# Patient Record
(demographics unavailable — no encounter records)

---

## 2024-10-08 NOTE — PHYSICAL EXAM
[Alert] : alert [Acute Distress] : no acute distress [Normocephalic] : normocephalic [Flat Open Anterior [place]] : flat open anterior fontanelle [PERRL] : PERRL [Red Reflex Bilateral] : red reflex bilateral [Normally Placed Ears] : normally placed ears [Auricles Well Formed] : auricles well formed [Clear Tympanic membranes] : clear tympanic membranes [Light reflex present] : light reflex present [Bony landmarks visible] : bony landmarks visible [Discharge] : no discharge [Nares Patent] : nares patent [Palate Intact] : palate intact [Uvula Midline] : uvula midline [Supple, full passive range of motion] : supple, full passive range of motion [Palpable Masses] : no palpable masses [Symmetric Chest Rise] : symmetric chest rise [Clear to Auscultation Bilaterally] : clear to auscultation bilaterally [Regular Rate and Rhythm] : regular rate and rhythm [S1, S2 present] : S1, S2 present [Murmurs] : no murmurs [+2 Femoral Pulses] : +2 femoral pulses [Soft] : soft [Tender] : nontender [Distended] : not distended [Bowel Sounds] : bowel sounds present [Hepatomegaly] : no hepatomegaly [Splenomegaly] : no splenomegaly [Normal external genitailia] : normal external genitalia [Clitoromegaly] : no clitoromegaly [Patent Vagina] : vagina patent [Normally Placed] : normally placed [No Abnormal Lymph Nodes Palpated] : no abnormal lymph nodes palpated [Pineda-Ortolani] : negative Pineda-Ortolani [Symmetric Flexed Extremities] : symmetric flexed extremities [Spinal Dimple] : no spinal dimple [Tuft of Hair] : no tuft of hair [Startle Reflex] : startle reflex present [Suck Reflex] : suck reflex present [Rooting] : rooting reflex present [Palmar Grasp] : palmar grasp reflex present [Plantar Grasp] : plantar grasp reflex present [Symmetric Ben] : symmetric [place] [Jaundice] : no jaundice [Rash and/or lesion present] : no rash/lesion [de-identified] : nevus flammeus of eyelid

## 2024-10-08 NOTE — DEVELOPMENTAL MILESTONES
[Passed] : passed [FreeTextEntry2] : 3 [Normal Development] : Normal Development [Calms when picked up or spoken to] : calms when picked up or spoken to [Looks briefly at objects] : looks briefly at objects [Alerts to unexpected sound] : alerts to unexpected sound [Makes brief short vowel sounds] : makes brief short vowel sounds [Holds chin up in prone] : holds chin up in prone [Holds fingers more open at rest] : holds fingers more open at rest

## 2024-10-08 NOTE — DISCUSSION/SUMMARY
[Normal Growth] : growth [Normal Development] : development  [Continue Regimen] : feeding [No Skin Concerns] : skin [Normal Sleep Pattern] : sleep [Term Infant] : term infant [Anticipatory Guidance Given] : Anticipatory guidance addressed as per the history of present illness section [Parental Well-Being] : parental well-being [Family Adjustment] : family adjustment [Feeding Routines] : feeding routines [Infant Adjustment] : infant adjustment [Safety] : safety [Hepatitis B] : hepatitis B [Parental Concerns Addressed] : Parental concerns addressed [de-identified] : Weight gain steady at 0.5  [] : The components of the vaccine(s) to be administered today are listed in the plan of care. The disease(s) for which the vaccine(s) are intended to prevent and the risks have been discussed with the caretaker.  The risks are also included in the appropriate vaccination information statements which have been provided to the patient's caregiver.  The caregiver has given consent to vaccinate. [FreeTextEntry1] : Risks and benefits discussed regarding the vaccines needed and the protection provided. 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 May offer pacifier if needed Start tummy time when awake Limit baby's exposure to others, especially those with fever or unknown vaccine status Parents counseled to call if rectal temperature >100.4 degrees F Return in one month for 2M visit; sooner PRN

## 2024-10-08 NOTE — HISTORY OF PRESENT ILLNESS
[Parents] : parents [Formula ___ oz/feed] : [unfilled] oz of formula per feed [Hours between feeds ___] : Child is fed every [unfilled] hours [Normal] : Normal [Frequency of stools: ___] : Frequency of stools: [unfilled]  stools [per day] : per day. [In Bassinet/Crib] : sleeps in bassinet/crib [On back] : sleeps on back [Pacifier use] : Pacifier use [No] : No cigarette smoke exposure [Water heater temperature set at <120 degrees F] : Water heater temperature set at <120 degrees F [Rear facing car seat in back seat] : Rear facing car seat in back seat [Carbon Monoxide Detectors] : Carbon monoxide detectors at home [Smoke Detectors] : Smoke detectors at home. [Loose bedding, pillow, toys, and/or bumpers in crib] : no loose bedding, pillow, toys, and/or bumpers in crib [FreeTextEntry1] :  35 day old here for routine visit  No concerns pt doing well with formula feeds, limited spitting up. Feeds increasing in volume as tolerated More awake, completing tummy time  Concern for possible aortic arch anomaly with most recent ECHO 9/13 being reassuring Denies cyanosis, diaphoresis, or difficulty with feeds

## 2024-11-22 NOTE — PHYSICAL EXAM
[Alert] : alert [Normocephalic] : normocephalic [Flat Open Anterior [place]] : flat open anterior fontanelle [PERRL] : PERRL [Red Reflex Bilateral] : red reflex bilateral [Normally Placed Ears] : normally placed ears [Auricles Well Formed] : auricles well formed [Clear Tympanic membranes] : clear tympanic membranes [Light reflex present] : light reflex present [Bony landmarks visible] : bony landmarks visible [Nares Patent] : nares patent [Palate Intact] : palate intact [Uvula Midline] : uvula midline [Supple, full passive range of motion] : supple, full passive range of motion [Symmetric Chest Rise] : symmetric chest rise [Clear to Auscultation Bilaterally] : clear to auscultation bilaterally [Regular Rate and Rhythm] : regular rate and rhythm [S1, S2 present] : S1, S2 present [+2 Femoral Pulses] : +2 femoral pulses [Soft] : soft [Bowel Sounds] : bowel sounds present [Normal external genitailia] : normal external genitalia [Patent Vagina] : vagina patent [Normally Placed] : normally placed [No Abnormal Lymph Nodes Palpated] : no abnormal lymph nodes palpated [Symmetric Flexed Extremities] : symmetric flexed extremities [Startle Reflex] : startle reflex present [Suck Reflex] : suck reflex present [Rooting] : rooting reflex present [Palmar Grasp] : palmar grasp reflex present [Plantar Grasp] : plantar grasp reflex present [Symmetric Ben] : symmetric [place] [Acute Distress] : no acute distress [Discharge] : no discharge [Palpable Masses] : no palpable masses [Murmurs] : no murmurs [Tender] : nontender [Distended] : not distended [Hepatomegaly] : no hepatomegaly [Splenomegaly] : no splenomegaly [Clitoromegaly] : no clitoromegaly [Pineda-Ortolani] : negative Pineda-Ortolani [Spinal Dimple] : no spinal dimple [Tuft of Hair] : no tuft of hair [Rash and/or lesion present] : no rash/lesion [FreeTextEntry7] : mild pectus excavatum on exam  [de-identified] : dry skin noted on abdomen

## 2024-11-22 NOTE — DISCUSSION/SUMMARY
Diet:   Low fat diet.    Activities:  As tolerated.    Discharge Medications:  None.      Other Instructions:  Call [facility] at [phone] or come to the Emergency Department if you experience the following: Chest pain, abdominal pain, jaundice, lightning of stool color or document of urine color  bleeding (vomiting of blood or coffee colored material, black stools or pasha blood in stools), fever/chills, nausea and vomiting or dizziness.      Follow-up:  DR. ANDREW CASANOVA in 6-8 weeks.Office phone # [phone].  If you already have an appointment keep the appointment.   [Normal Growth] : growth [Normal Development] : development  [Continue Regimen] : feeding [Anticipatory Guidance Given] : Anticipatory guidance addressed as per the history of present illness section [Parental (Maternal) Well-Being] : parental (maternal) well-being [Infant-Family Synchrony] : infant-family synchrony [Nutritional Adequacy] : nutritional adequacy [Infant Behavior] : infant behavior [Safety] : safety [Age Approp Vaccines] : Age appropriate vaccines administered [DTaP] : diptheria, tetanus and pertussis [HiB] : haemophilus influenzae type B [IPV] : inactivated poliovirus [PCV] : pneumococcal conjugate vaccine [Rotavirus] : rotavirus [Parental Concerns Addressed] : Parental concerns addressed [] : The components of the vaccine(s) to be administered today are listed in the plan of care. The disease(s) for which the vaccine(s) are intended to prevent and the risks have been discussed with the caretaker.  The risks are also included in the appropriate vaccination information statements which have been provided to the patient's caregiver.  The caregiver has given consent to vaccinate. 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 Reviewed signs of distress or concerning symptoms to be aware of Pentacel, Prevnar, and Rotateq given by LPN w/out adverse event Age appropriate anticipatory guidance given as well as guidance regarding immunizations Infant acetaminophen dosing sheet provided Continue formula feeds, recommend iron-fortified formulations, 2-4 oz every 3-4 hrs.  When in car, patient should be in rear-facing car seat in back seat.  Put baby to sleep on back, in own crib with no loose or soft bedding.  Help baby to maintain sleep and feeding routines.  Continue tummy time when awake Return for 4M well visit, sooner PRN

## 2024-11-22 NOTE — HISTORY OF PRESENT ILLNESS
[Mother] : mother [Formula ___ oz/feed] : [unfilled] oz of formula per feed [Hours between feeds ___] : Child is fed every [unfilled] hours [Normal] : Normal [Frequency of stools: ___] : Frequency of stools: [unfilled]  stools [per day] : per day. [In Bassinet/Crib] : sleeps in bassinet/crib [On back] : sleeps on back [No] : No cigarette smoke exposure [Water heater temperature set at <120 degrees F] : Water heater temperature set at <120 degrees F [Rear facing car seat in back seat] : Rear facing car seat in back seat [Carbon Monoxide Detectors] : Carbon monoxide detectors at home [Co-sleeping] : no co-sleeping [FreeTextEntry1] : 2M here for  visit  Doing well with feeds, minimal spitting up, taking Alimentum formula. Sleeping through night, struggles with naps, contact napping during day Developmentally with social smile, trying to roll per parent when completing tummy time  Concerns for aortic arch anomaly with ECHO  being reassuring, concern for possible small PFO and very trivial PDA. Cardiology recommend f/u ECHO but unable to complete per mother due to insurance issue. No increased WOB, no cyanosis with feeding, no diaphoresis.

## 2024-12-09 NOTE — PHYSICAL EXAM
[[place]] : fontanelle flat [Clear Rhinorrhea] : clear rhinorrhea [Erythematous Oropharynx] : erythematous oropharynx [Wheezing] : no wheezing [Rales] : no rales [Tachypnea] : no tachypnea [Rhonchi] : no rhonchi [Subcostal Retractions] : no subcostal retractions [Suprasternal Retractions] : no suprasternal retractions [NL] : warm, clear

## 2024-12-09 NOTE — REVIEW OF SYSTEMS
[Nasal Discharge] : nasal discharge [Nasal Congestion] : nasal congestion [Tachypnea] : not tachypneic [Wheezing] : no wheezing [Cough] : no cough [Negative] : Genitourinary

## 2024-12-09 NOTE — HISTORY OF PRESENT ILLNESS
[FreeTextEntry6] : 3 M BIB father for concerns of cold symptoms. Parent reports congestion x 3 days. Pt with no fever. No significant cough. Some decreased appetite this morning due to congestion. No increased WOB, no cyanosis. Reports tolerating PO, no emesis, normal UOP, normal bowel movements. No close sick contacts.

## 2024-12-09 NOTE — DISCUSSION/SUMMARY
[FreeTextEntry1] : Acetaminophen as needed for fever Supportive care: cool mist humidifier, increase hydration, gentle nasal suction Appropriate anticipatory guidance and education given; seek care if symptoms persist or worsen, or if w/dec wet diapers, signs of distress (reviewed w/parent signs of resp distress), inability to tolerate PO

## 2025-01-17 NOTE — PHYSICAL EXAM
[Alert] : alert [Normocephalic] : normocephalic [Flat Open Anterior [place]] : flat open anterior fontanelle [Red Reflex] : red reflex bilateral [PERRL] : PERRL [Normally Placed Ears] : normally placed ears [Auricles Well Formed] : auricles well formed [Clear Tympanic membranes] : clear tympanic membranes [Light reflex present] : light reflex present [Bony landmarks visible] : bony landmarks visible [Nares Patent] : nares patent [Palate Intact] : palate intact [Uvula Midline] : uvula midline [Symmetric Chest Rise] : symmetric chest rise [Clear to Auscultation Bilaterally] : clear to auscultation bilaterally [Regular Rate and Rhythm] : regular rate and rhythm [S1, S2 present] : S1, S2 present [+2 Femoral Pulses] : (+) 2 femoral pulses [Soft] : soft [Bowel Sounds] : bowel sounds present [Normal External Genitalia] : normal external genitalia [Normal Vaginal Introitus] : normal vaginal introitus [Patent] : patent [Normally Placed] : normally placed [No Abnormal Lymph Nodes Palpated] : no abnormal lymph nodes palpated [Startle Reflex] : startle reflex present [Plantar Grasp] : plantar grasp reflex present [Symmetric Ben] : symmetric [place] [Acute Distress] : no acute distress [Discharge] : no discharge [Palpable Masses] : no palpable masses [Murmurs] : no murmurs [Tender] : nontender [Distended] : nondistended [Hepatomegaly] : no hepatomegaly [Splenomegaly] : no splenomegaly [Clitoromegaly] : no clitoromegaly [Pineda-Ortolani] : negative Pineda-Ortolani [Allis Sign] : negative Allis sign [Spinal Dimple] : no spinal dimple [Tuft of Hair] : no tuft of hair [Rash or Lesions] : no rash/lesions [FreeTextEntry8] : mild depression of the chest concerning for pectus excavatum

## 2025-01-17 NOTE — DISCUSSION/SUMMARY
[Normal Growth] : growth [Normal Development] : development  [No Elimination Concerns] : elimination [No Skin Concerns] : skin [Normal Sleep Pattern] : sleep [Anticipatory Guidance Given] : Anticipatory guidance addressed as per the history of present illness section [Family Functioning] : family functioning [Nutritional Adequacy and Growth] : nutritional adequacy and growth [Infant Development] : infant development [Oral Health] : oral health [Safety] : safety [DTaP] : diptheria, tetanus and pertussis [HiB] : haemophilus influenzae type B [IPV] : inactivated poliovirus [PCV] : pneumococcal conjugate vaccine [Rotavirus] : rotavirus [Parental Concerns Addressed] : Parental concerns addressed [] : The components of the vaccine(s) to be administered today are listed in the plan of care. The disease(s) for which the vaccine(s) are intended to prevent and the risks have been discussed with the caretaker.  The risks are also included in the appropriate vaccination information statements which have been provided to the patient's caregiver.  The caregiver has given consent to vaccinate. [FreeTextEntry1] :  Mild pectus excavatum, not affecting feeding, no increased WOB, will continue to monitor Recommend eval with cardio ASAP as pt overdue Feeding discussed including addition of solids if desired Cereal may be introduced using a spoon and bowl When in car, patient should be in rear-facing car seat in back seat Put baby to sleep on back, in own crib with no loose or soft bedding Lower crib mattress. Help baby to maintain sleep and feeding routines. May offer pacifier if needed. Continue tummy time when awake Pentacel, Prevnar, and Rotateq given w/out adverse event Age-appropriate anticipatory guidance given as well as guidance regarding immunizations Return for 6M well visit, sooner PRN

## 2025-01-17 NOTE — DEVELOPMENTAL MILESTONES
[Laughs aloud] : laughs aloud [Turns to voice] : turns to voice [Vocalizes with extending cooing] : vocalizes with extending cooing [Supports on elbows & wrists in prone] : supports on elbows and wrists in prone [Keeps hands unfisted] : keeps hands unfisted [Grasps objects] : grasps objects

## 2025-02-24 NOTE — REVIEW OF SYSTEMS
[___ Formula] : [unfilled] Formula  [___ ounces/feeding] : ~IRLANDA adam/feeding [___ Times/day] : [unfilled] times/day [Acting Fussy] : acting ~L fussy [Nl] : no feeding issues at this time. [Fever] : no fever [Wgt Loss (___ Lbs)] : no recent weight loss [Pallor] : not pale [Discharge] : no discharge [Redness] : no redness [Nasal Discharge] : no nasal discharge [Nasal Stuffiness] : no nasal congestion [Stridor] : no stridor [Cyanosis] : no cyanosis [Edema] : no edema [Diaphoresis] : not diaphoretic [Tachypnea] : not tachypneic [Wheezing] : no wheezing [Cough] : no cough [Being A Poor Eater] : not a poor eater [Vomiting] : no vomiting [Diarrhea] : no diarrhea [Decrease In Appetite] : appetite not decreased [Fainting (Syncope)] : no fainting [Dec Consciousness] :  no decrease in consciousness [Seizure] : no seizures [Hypotonicity (Flaccid)] : not hypotonic [Refusal to Bear Wgt] : normal weight bearing [Puffy Hands/Feet] : no hand/feet puffiness [Rash] : no rash [Hemangioma] : no hemangioma [Jaundice] : no jaundice [Wound problems] : no wound problems [Bruising] : no tendency for easy bruising [Swollen Glands] : no lymphadenopathy [Enlarged [place]] : the fontanelle was not enlarged [Hoarse Cry] : no hoarse cry [Failure To Thrive] : no failure to thrive [Vaginal Discharge] : no vaginal discharge [Ambiguous Genitals] : genitals not ambiguous [Dec Urine Output] : no oliguria

## 2025-02-24 NOTE — CARDIOLOGY SUMMARY
[Today's Date] : [unfilled] [FreeTextEntry1] : Normal sinus rhythm, , normal axes and intervals. Normal ECG [de-identified] : Summary: 1. Follow up study for fetal concern for coarctation. 2. Patent foramen ovale with left to right shunt, normal variant. 3. Trivial patent ductus arteriosus with continuous left to right shunt. 4. The aortic isthmus measures within normal limits on this study. Laminar flow through the aortic arch with no descending aortic gradient. 5. Normal right ventricular morphology with qualitatively normal size and systolic function. 6. Normal left ventricular size, morphology and systolic function. 7. No pericardial effusion.

## 2025-02-24 NOTE — REASON FOR VISIT
[Mother] : mother [Father] : father [Initial Evaluation] : an initial evaluation of [Parents] : parents [FreeTextEntry3] : mom had fetals

## 2025-02-24 NOTE — CONSULT LETTER
[Today's Date] : [unfilled] [Name] : Name: [unfilled] [] : : ~~ [Today's Date:] : [unfilled] [Dear  ___:] : Dear Dr. [unfilled]: [Consult - Single Provider] : Thank you very much for allowing me to participate in the care of this patient. If you have any questions, please do not hesitate to contact me. [Sincerely,] : Sincerely, [FreeTextEntry4] : Dr. Matias  [FreeTextEntry5] : [address] [facility]  [FreeTextEntry6] : [address] [facility]  [FreeTextEntry7] : [phone] [de-identified] : Micki Barragan MD, FRCPC, FAAP Pediatric Cardiology [facility] [email or web address]

## 2025-04-10 NOTE — DEVELOPMENTAL MILESTONES
[Normal Development] : Normal Development [Pats or smiles at reflection] : pats or smiles at reflection [Begins to turn when name called] : begins to turn when name called [Babbles] : babbles [Rolls over prone to supine] : rolls over prone to supine [Sits briefly without support] : sits briefly without support [Reaches for object and transfers] : reaches for object and transfers [Rakes small object with 4 fingers] : does not rake small object with 4 fingers

## 2025-04-10 NOTE — HISTORY OF PRESENT ILLNESS
[Mother] : mother [Formula ___ oz/feed] : [unfilled] oz of formula per feed [___ Feeding per 24 hrs] : a  total of [unfilled] feedings in 24 hours [Fruits] : fruits [Vegetables] : vegetables [Frequency of stools: ___] : Frequency of stools: [unfilled]  stools [per day] : per day. [Firm] : firm consistency [In Bassinet/Crib] : sleeps in bassinet/crib [Pacifier use] : Pacifier use [Rear facing car seat in back seat] : Rear facing car seat in back seat [Smoke Detectors] : Smoke detectors at home. [Loose bedding, pillow, toys, and/or bumpers in crib] : no loose bedding, pillow, toys, and/or bumpers in crib [FreeTextEntry1] : 7M here for routine visit  Doing well with feeds, minimal spitting up, taking Alimentum formula. Introduced some pureed fruit and vegetables, parent concerned about gas w/some relief w/mylicon drops. Stooling three x a day but reports firm.  Developmentally responding to name, babbling, sitting up  Concerns for aortic arch anomaly with ECHO 9/13 being reassuring, concern for possible small PFO and very trivial PDA. Cardiology re-evaluated pt 2/24 with normal arch noted on ECHO and w/small PFO (normal variant) and trivial PDA. No further cardio f/u needed

## 2025-04-10 NOTE — PHYSICAL EXAM
[Alert] : alert [Normocephalic] : normocephalic [Flat Open Anterior [place]] : flat open anterior fontanelle [Red Reflex] : red reflex bilateral [PERRL] : PERRL [Normally Placed Ears] : normally placed ears [Auricles Well Formed] : auricles well formed [Clear Tympanic membranes] : clear tympanic membranes [Light reflex present] : light reflex present [Bony landmarks visible] : bony landmarks visible [Nares Patent] : nares patent [Palate Intact] : palate intact [Uvula Midline] : uvula midline [Supple, full passive range of motion] : supple, full passive range of motion [Symmetric Chest Rise] : symmetric chest rise [Clear to Auscultation Bilaterally] : clear to auscultation bilaterally [Regular Rate and Rhythm] : regular rate and rhythm [S1, S2 present] : S1, S2 present [+2 Femoral Pulses] : (+) 2 femoral pulses [Soft] : soft [Bowel Sounds] : bowel sounds present [Normal External Genitalia] : normal external genitalia [Normal Vaginal Introitus] : normal vaginal introitus [Patent] : patent [Normally Placed] : normally placed [No Abnormal Lymph Nodes Palpated] : no abnormal lymph nodes palpated [Symmetric Buttocks Creases] : symmetric buttocks creases [Plantar Grasp] : plantar grasp reflex present [Cranial Nerves Grossly Intact] : cranial nerves grossly intact [Acute Distress] : no acute distress [Discharge] : no discharge [Tooth Eruption] : no tooth eruption [Palpable Masses] : no palpable masses [Murmurs] : no murmurs [Tender] : nontender [Distended] : nondistended [Hepatomegaly] : no hepatomegaly [Splenomegaly] : no splenomegaly [Clitoromegaly] : no clitoromegaly [Pineda-Ortolani] : negative Pineda-Ortolani [Allis Sign] : negative Allis sign [Spinal Dimple] : no spinal dimple [Tuft of Hair] : no tuft of hair [Rash or Lesions] : no rash/lesions [de-identified] : no belly breathing, no tachypnea [de-identified] : mild pectus excavatum noted [de-identified] : + nevus simplex of nape of neck

## 2025-04-10 NOTE — DISCUSSION/SUMMARY
[Normal Growth] : growth [Normal Development] : development [Term Infant] : Term infant [Family Functioning] : family functioning [Nutrition and Feeding] : nutrition and feeding [Infant Development] : infant development [Oral Health] : oral health [Safety] : safety [Parental Concerns Addressed] : Parental concerns addressed [] : The components of the vaccine(s) to be administered today are listed in the plan of care. The disease(s) for which the vaccine(s) are intended to prevent and the risks have been discussed with the caretaker.  The risks are also included in the appropriate vaccination information statements which have been provided to the patient's caregiver.  The caregiver has given consent to vaccinate. [de-identified] : Concern for constipation. D/w parent addition of water in sippy cup and trial of prune juice. Seek care if continues [FreeTextEntry1] : Pentacel, Prevanr, and Rotateq given by LPN w/out adverse event Introduce allergenic foods such as eggs, nuts, peanut butter, and seafood Feeding discussed. Introduce single-ingredient foods rich in iron, one at a time.  Incorporate up to 4 oz of fluorinated water daily in a sippy cup Lead screen reviewed, no concerning factors When teeth erupt wipe daily with washcloth When in car, patient should be in rear-facing car seat in back seat Put baby to sleep on back, in own crib with no loose or soft bedding Help baby to maintain sleep and feeding routines Continue tummy time when awake Ensure home is safe since baby is now more mobile.  Read aloud to baby. Age-appropriate guidance given Return for 9M well visit, sooner PRN

## 2025-06-06 NOTE — DISCUSSION/SUMMARY
[Normal Growth] : growth [Normal Development] : development [Term Infant] : Term infant [Family Adaptation] : family adaptation [Infant [place]] : infant independence [Feeding Routine] : feeding routine [Safety] : safety [] : The components of the vaccine(s) to be administered today are listed in the plan of care. The disease(s) for which the vaccine(s) are intended to prevent and the risks have been discussed with the caretaker.  The risks are also included in the appropriate vaccination information statements which have been provided to the patient's caregiver.  The caregiver has given consent to vaccinate. [de-identified] : HT unchanged but likely related to discrepancy of measurement at last visit [FreeTextEntry1] :   Hep B given; anticipatory guidance re: vaccines provided Age-appropriate anticipatory guidance given  SWYC reviewed, no developmental concerns Continue allergenic foods such as eggs, nuts, peanut butter, and seafood Continue formula feeds, introduce single-ingredient foods rich in iron, one at a time.  Incorporate up to 4 oz of fluorinated water daily in a sippy cup When teeth erupt wipe daily with washcloth When in car, patient should be in rear-facing car seat in back seat Put baby to sleep on back, in own crib with no loose or soft bedding Help baby to maintain sleep and feeding routines Ensure home is safe since baby is now more mobile.  Read aloud to baby. Age-appropriate guidance given Return for 12 M checkup, sooner PRN

## 2025-06-06 NOTE — PHYSICAL EXAM
[Alert] : alert [Normocephalic] : normocephalic [Flat Open Anterior [place]] : flat open anterior fontanelle [Red Reflex] : red reflex bilateral [PERRL] : PERRL [Normally Placed Ears] : normally placed ears [Auricles Well Formed] : auricles well formed [Clear Tympanic membranes] : clear tympanic membranes [Light reflex present] : light reflex present [Bony landmarks visible] : bony landmarks visible [Nares Patent] : nares patent [Palate Intact] : palate intact [Uvula Midline] : uvula midline [Supple, full passive range of motion] : supple, full passive range of motion [Symmetric Chest Rise] : symmetric chest rise [Clear to Auscultation Bilaterally] : clear to auscultation bilaterally [Regular Rate and Rhythm] : regular rate and rhythm [S1, S2 present] : S1, S2 present [+2 Femoral Pulses] : (+) 2 femoral pulses [Soft] : soft [Bowel Sounds] : bowel sounds present [Normal External Genitalia] : normal external genitalia [Normal Vaginal Introitus] : normal vaginal introitus [No Abnormal Lymph Nodes Palpated] : no abnormal lymph nodes palpated [Symmetric abduction and rotation of hips] : symmetric abduction and rotation of hips [Symmetric Buttocks Creases] : symmetric buttocks creases [Straight] : straight [Cranial Nerves Grossly Intact] : cranial nerves grossly intact [Acute Distress] : no acute distress [Excessive Tearing] : no excessive tearing [Discharge] : no discharge [Palpable Masses] : no palpable masses [Murmurs] : no murmurs [Tender] : nontender [Distended] : nondistended [Hepatomegaly] : no hepatomegaly [Splenomegaly] : no splenomegaly [Clitoromegaly] : no clitoromegaly [Leg Length Discrepancy] : no leg length discrepancy [Rash or Lesions] : no rash/lesions [de-identified] : mild pectus excavatum

## 2025-06-06 NOTE — HISTORY OF PRESENT ILLNESS
[Parents] : parents [Formula ___ oz/feed] : [unfilled] oz of formula per feed [___ Feeding per 24 hrs] : a total of [unfilled] feedings is 24 hours [Vegetables] : vegetables [Finger foods] : finger foods [Normal] : Normal [Frequency of stools: ___] : Frequency of stools: [unfilled]  stools [per day] : per day. [In Crib] : sleeps in crib [None] : Primary Fluoride Source: None [Rear facing car seat in  back seat] : Rear facing car seat in  back seat [Smoke Detectors] : Smoke detectors [Wakes up at night] : does not wake up at night [Loose bedding, pillow, toys, and/or bumpers in crib] : no loose bedding, pillow, toys, and/or bumpers in crib [FreeTextEntry1] : 9m here for routine visit  Doing well with feeds, minimal spitting up, taking Alimentum formula. 6 oz per feed, 6 feeds per day. Having one serving of pureed vegetables daily and finger foods Developmentally: "mama, karis" nonspecifically, pincer grasp, sitting up, not pulling for stand  Concerns for aortic arch anomaly with ECHO 9/13 being reassuring, concern for possible small PFO and very trivial PDA. Cardiology re-evaluated pt 2/24 with normal arch noted on ECHO and w/small PFO (normal variant) and trivial PDA. No further cardio f/u needed

## 2025-06-06 NOTE — DEVELOPMENTAL MILESTONES
[Uses basic gestures] : uses basic gestures [Says "Srinivasan" or "Mama"] : says "Srinivasan" or "Mama" nonspecifically [Sits well without support] : sits well without support [Picks up small objects with 3 fingers] : picks up small objects with 3 fingers and thumb [Yes] : Completed. [Transitions between sitting and lying] : does not transition between sitting and lying

## 2025-07-29 NOTE — REVIEW OF SYSTEMS
[Fussy] : fussy [Malaise] : malaise [Fever] : fever [Eye Redness] : eye redness [Rash] : rash [Negative] : Genitourinary [Eye Discharge] : no eye discharge [Cough] : no cough

## 2025-07-29 NOTE — DISCUSSION/SUMMARY
[FreeTextEntry1] : Extensively discussed coxsackie, natural course, and supportive treatment Ibuprofen/acetaminophen as needed for fever/discomfort. Ibuprofen given in office Supportive care: increase hydration, cool items often soothing for oral lesions Appropriate anticipatory guidance and education given; seek care if symptoms persist or worsen, or if w/dec wet diapers/UOP, signs of distress (reviewed w/parent signs of resp distress), inability to tolerate PO.

## 2025-07-29 NOTE — HISTORY OF PRESENT ILLNESS
[FreeTextEntry6] : 10 m old presenting with rash and fever. Pt started with fever last night, Tmax in office with 102.2 temp. This morning noted development of rash. Pt irritable. Had bottle this morning and food, refusing PO this afternoon with decreased UOP. No cough or URI symptoms. Denies ear tugging, no n/v/d. No close sick contacts.

## 2025-07-29 NOTE — PHYSICAL EXAM
[Irritable] : irritable [Erythematous Oropharynx] : erythematous oropharynx [Vesicles] : vesicles present [NL] : normotonic [Sunken [place]] : fontanelle flat [Conjuctival Injection] : no conjunctival injection [de-identified] : discrete small blister like lesions distributed on the upper extremities b/l, + lesion on palm of RT side, similar lesions in /buttock region